# Patient Record
Sex: MALE | Employment: PART TIME | URBAN - METROPOLITAN AREA
[De-identification: names, ages, dates, MRNs, and addresses within clinical notes are randomized per-mention and may not be internally consistent; named-entity substitution may affect disease eponyms.]

---

## 2019-07-14 ENCOUNTER — APPOINTMENT (OUTPATIENT)
Dept: GENERAL RADIOLOGY | Age: 19
End: 2019-07-14
Payer: COMMERCIAL

## 2019-07-14 ENCOUNTER — HOSPITAL ENCOUNTER (EMERGENCY)
Age: 19
Discharge: HOME | End: 2019-07-14
Attending: EMERGENCY MEDICINE
Payer: COMMERCIAL

## 2019-07-14 VITALS
SYSTOLIC BLOOD PRESSURE: 136 MMHG | BODY MASS INDEX: 21.69 KG/M2 | DIASTOLIC BLOOD PRESSURE: 82 MMHG | HEART RATE: 101 BPM | OXYGEN SATURATION: 99 % | RESPIRATION RATE: 16 BRPM | TEMPERATURE: 98.4 F | HEIGHT: 66 IN | WEIGHT: 135 LBS

## 2019-07-14 DIAGNOSIS — S93.401A SPRAIN OF RIGHT ANKLE, UNSPECIFIED LIGAMENT, INITIAL ENCOUNTER: Primary | ICD-10-CM

## 2019-07-14 DIAGNOSIS — S60.211A CONTUSION OF RIGHT WRIST, INITIAL ENCOUNTER: ICD-10-CM

## 2019-07-14 PROCEDURE — 73130 X-RAY EXAM OF HAND: CPT

## 2019-07-14 PROCEDURE — 99283 EMERGENCY DEPT VISIT LOW MDM: CPT

## 2019-07-14 PROCEDURE — 73110 X-RAY EXAM OF WRIST: CPT

## 2019-07-14 PROCEDURE — 73610 X-RAY EXAM OF ANKLE: CPT

## 2019-07-14 RX ORDER — IBUPROFEN 800 MG/1
800 TABLET ORAL EVERY 6 HOURS PRN
Qty: 20 TABLET | Refills: 0 | Status: SHIPPED | OUTPATIENT
Start: 2019-07-14 | End: 2021-06-09 | Stop reason: ALTCHOICE

## 2019-07-14 RX ORDER — LORATADINE 10 MG/1
10 CAPSULE, LIQUID FILLED ORAL DAILY
COMMUNITY

## 2019-07-14 RX ORDER — IBUPROFEN 200 MG
200 TABLET ORAL EVERY 6 HOURS PRN
COMMUNITY
End: 2019-07-14 | Stop reason: SDUPTHER

## 2019-07-14 ASSESSMENT — PAIN DESCRIPTION - PAIN TYPE: TYPE: ACUTE PAIN

## 2019-07-14 ASSESSMENT — PAIN DESCRIPTION - DESCRIPTORS: DESCRIPTORS: THROBBING

## 2019-07-14 ASSESSMENT — PAIN DESCRIPTION - FREQUENCY: FREQUENCY: CONTINUOUS

## 2019-07-14 ASSESSMENT — PAIN DESCRIPTION - ORIENTATION: ORIENTATION: RIGHT

## 2019-07-14 ASSESSMENT — PAIN DESCRIPTION - LOCATION: LOCATION: ANKLE;WRIST

## 2019-07-14 ASSESSMENT — PAIN SCALES - GENERAL: PAINLEVEL_OUTOF10: 10

## 2019-07-14 NOTE — ED PROVIDER NOTES
HPI:  7/14/19,   Time: 7:29 PM         Michael Camejo is a 20 y.o. male presenting to the ED for right ankle and right hand and wrist injury, beginning more than 1 day ago. The complaint has been persistent, moderate in severity, and worsened by nothing. States twisted his right ankle and then was upset and then punched a wall with the right hand and wrist.  No other injuriesOS:   Pertinent positives and negatives are stated within HPI, all other systems reviewed and are negative.  --------------------------------------------- PAST HISTORY ---------------------------------------------  Past Medical History:  has a past medical history of Acute eczema. Past Surgical History:  has a past surgical history that includes Mouth surgery. Social History:  reports that he has never smoked. He has never used smokeless tobacco. He reports that he drinks alcohol. He reports that he has current or past drug history. Drug: Marijuana. Family History: family history is not on file. The patients home medications have been reviewed. Allergies: Augmentin [amoxicillin-pot clavulanate] and Ceclor [cefaclor]    -------------------------------------------------- RESULTS -------------------------------------------------  All laboratory and radiology results have been personally reviewed by myself   LABS:  No results found for this visit on 07/14/19. RADIOLOGY:  Interpreted by Radiologist.  XR HAND RIGHT (MIN 3 VIEWS)   Final Result      NO ACUTE FRACTURE OR DISLOCATION RIGHT HAND. XR ANKLE RIGHT (MIN 3 VIEWS)   Final Result   No evidence of acute bony pathology. Lateral ankle soft tissue   swelling is present, and suggestive of a sprain. XR WRIST RIGHT (MIN 3 VIEWS)   Final Result      NO ACUTE FRACTURE OR DISLOCATION RIGHT WRIST.        If pain persist I would recommend CT or MRI of the wrist for further   workup.             ------------------------- NURSING NOTES AND VITALS REVIEWED

## 2021-06-09 ENCOUNTER — OFFICE VISIT (OUTPATIENT)
Dept: FAMILY MEDICINE CLINIC | Age: 21
End: 2021-06-09
Payer: COMMERCIAL

## 2021-06-09 VITALS
DIASTOLIC BLOOD PRESSURE: 72 MMHG | WEIGHT: 148 LBS | BODY MASS INDEX: 23.78 KG/M2 | SYSTOLIC BLOOD PRESSURE: 136 MMHG | RESPIRATION RATE: 18 BRPM | TEMPERATURE: 97.7 F | HEIGHT: 66 IN | OXYGEN SATURATION: 98 % | HEART RATE: 74 BPM

## 2021-06-09 DIAGNOSIS — S61.211A LACERATION OF LEFT INDEX FINGER WITHOUT FOREIGN BODY WITHOUT DAMAGE TO NAIL, INITIAL ENCOUNTER: Primary | ICD-10-CM

## 2021-06-09 PROCEDURE — 29130 APPL FINGER SPLINT STATIC: CPT | Performed by: PHYSICIAN ASSISTANT

## 2021-06-09 PROCEDURE — 90715 TDAP VACCINE 7 YRS/> IM: CPT | Performed by: PHYSICIAN ASSISTANT

## 2021-06-09 PROCEDURE — 12001 RPR S/N/AX/GEN/TRNK 2.5CM/<: CPT | Performed by: PHYSICIAN ASSISTANT

## 2021-06-09 PROCEDURE — 90471 IMMUNIZATION ADMIN: CPT | Performed by: PHYSICIAN ASSISTANT

## 2021-06-09 RX ORDER — DOXYCYCLINE HYCLATE 100 MG
100 TABLET ORAL 2 TIMES DAILY
Qty: 20 TABLET | Refills: 0 | Status: SHIPPED | OUTPATIENT
Start: 2021-06-09 | End: 2021-06-19

## 2021-06-10 ENCOUNTER — TELEPHONE (OUTPATIENT)
Dept: PRIMARY CARE CLINIC | Age: 21
End: 2021-06-10

## 2021-06-10 NOTE — TELEPHONE ENCOUNTER
Spoke with the patient. Patient states he is doing fine. Finger is minimally sore but tolerable. Advised on proper wound care. Patient will call with any questions or concerns.

## 2022-06-10 NOTE — PROGRESS NOTES
20  Pam Smith : 2000 Sex: male  Age 24 y.o. Subjective:  Chief Complaint   Patient presents with    Laceration     left hand index finger 20         HPI:   Pam Smith , 24 y.o. male presents to [facility] care for evaluation of laceration to the left index finger. The patient is right-hand dominant. The patient states that he was using a kitchen knife and was preparing to make a meal and slipped and cut himself. The patient has a flap type laceration over the radial aspect of the left index finger. The patient has some bleeding but no evidence of arterial bleeding. The patient is unsure of last tetanus. The patient is not having any fever, chills. No lightheadedness or dizziness. The patient is not currently on any antibiotics. He does not take any blood thinners. ROS:   Unless otherwise stated in this report the patient's positive and negative responses for review of systems for constitutional, eyes, ENT, cardiovascular, respiratory, gastrointestinal, neurological, , musculoskeletal, and integument systems and related systems to the presenting problem are either stated in the history of present illness or were not pertinent or were negative for the symptoms and/or complaints related to the presenting medical problem. Positives and pertinent negatives as per HPI. All others reviewed and are negative. PMH:     Past Medical History:   Diagnosis Date    Acute eczema        Past Surgical History:   Procedure Laterality Date    MOUTH SURGERY         No family history on file. Medications:     Current Outpatient Medications:     loratadine (CLARITIN) 10 MG capsule, Take 10 mg by mouth daily, Disp: , Rfl:     Allergies:      Allergies   Allergen Reactions    Augmentin [Amoxicillin-Pot Clavulanate]     Ceclor [Cefaclor]        Social History:     Social History     Tobacco Use    Smoking status: Never Smoker    Smokeless tobacco: Never Used   Vaping Use    Vaping Use: Every day    Substances: Occasionally   Substance Use Topics    Alcohol use: Yes     Comment: social    Drug use: Yes     Types: Marijuana       Patient lives at home. Physical Exam:     Vitals:    06/09/21 1639   BP: 136/72   Pulse: 74   Resp: 18   Temp: 97.7 °F (36.5 °C)   SpO2: 98%   Weight: 148 lb (67.1 kg)   Height: 5' 6\" (1.676 m)       Exam:  Physical Exam  Vital signs reviewed and nurse's notes. The patient is not hypoxic. General: Alert, no acute distress, patient resting comfortably   Skin: warm, intact, no pallor noted   Head: Normocephalic, atraumatic   Eye: Normal conjunctiva   Respiratory: No acute distress   Musculoskeletal: There is no obvious deformity noted to the left index finger. The patient does have some bleeding from the left index finger of the proximal phalanx of the proximal aspect. There is a 2.2 cm somewhat curvilinear laceration noted mostly to the radial aspect of the dorsal proximal phalanx. The patient has no deep structure involvement. Seems to be somewhat superficial.  There does not appear to be any evidence of foreign body. No involvement of the nail or the nail bed. The patient has good range of motion with flexion, extension and 5/5 strength with flexion and extension no deficit to any of the other digits  Neurological: alert and orient x4, normal sensory and motor observed. Psychiatric: Cooperative        Testing:           Medical Decision Making:     Laceration repair:     Done under sterile conditions. The use of iodine was used to prep and clean the area. Local injection with lidocaine 1% was used, approximately 2 cc. The wound was irrigated copiously with normal saline. The wound was explored there was no evidence of foreign material. The laceration was approximated with 4-0 nylon. 4 simple interrupted sutures were placed. Patient tolerated the procedure well.      The patient was neurovascularly intact post. the patient had topical antibiotic ointment applied to the laceration and a dry sterile dressing was place. The patient will need to follow-up in the next 10-14 days for removal.    The patient had the topical antibiotic ointment applied, a dressing applied and was placed in a splint. Additionally the patient will be started on doxycycline, the patient has allergies to Augmentin and Ceclor. The patient will also be given Bactroban ointment to be applied to the area. Monitor for signs of infection. The patient had the splint and placed in dressing and the patient was neurovascularly intact with good  capillary refill less than 2 seconds. The patient and mother were comfortable with the plan the patient is neurovascularly intact      Clinical Impression:   Abel Ayon was seen today for laceration. Diagnoses and all orders for this visit:    Laceration of left index finger without foreign body without damage to nail, initial encounter  -     Jacy 82    Other orders  -     Tdap (age 6y and older) IM (BOOSTRIX)  -     doxycycline hyclate (VIBRA-TABS) 100 MG tablet; Take 1 tablet by mouth 2 times daily for 10 days  -     mupirocin (BACTROBAN) 2 % ointment; Apply 3 times daily. The patient is to call for any concerns or return if any of the signs or symptoms worsen. The patient is to follow-up with PCP in the next 2-3 days for repeat evaluation repeat assessment or go directly to the emergency department.      SIGNATURE: Skip Hayes III, PA-C What Is The Reason For Today's Visit?: History of Non-Melanoma Skin Cancer How Many Skin Cancers Have You Had?: one When Was Your Last Cancer Diagnosed?: 2021